# Patient Record
Sex: FEMALE | Race: WHITE | NOT HISPANIC OR LATINO | URBAN - NONMETROPOLITAN AREA
[De-identification: names, ages, dates, MRNs, and addresses within clinical notes are randomized per-mention and may not be internally consistent; named-entity substitution may affect disease eponyms.]

---

## 2018-03-29 ENCOUNTER — OFFICE VISIT CONVERTED (OUTPATIENT)
Dept: FAMILY MEDICINE CLINIC | Facility: CLINIC | Age: 63
End: 2018-03-29
Attending: NURSE PRACTITIONER

## 2018-03-29 ENCOUNTER — CONVERSION ENCOUNTER (OUTPATIENT)
Dept: FAMILY MEDICINE CLINIC | Facility: CLINIC | Age: 63
End: 2018-03-29

## 2018-06-13 ENCOUNTER — OFFICE VISIT CONVERTED (OUTPATIENT)
Dept: FAMILY MEDICINE CLINIC | Facility: CLINIC | Age: 63
End: 2018-06-13
Attending: NURSE PRACTITIONER

## 2018-08-21 ENCOUNTER — CONVERSION ENCOUNTER (OUTPATIENT)
Dept: FAMILY MEDICINE CLINIC | Facility: CLINIC | Age: 63
End: 2018-08-21
Attending: NURSE PRACTITIONER

## 2018-09-11 ENCOUNTER — OFFICE VISIT CONVERTED (OUTPATIENT)
Dept: FAMILY MEDICINE CLINIC | Facility: CLINIC | Age: 63
End: 2018-09-11
Attending: NURSE PRACTITIONER

## 2018-09-27 ENCOUNTER — OFFICE VISIT CONVERTED (OUTPATIENT)
Dept: FAMILY MEDICINE CLINIC | Facility: CLINIC | Age: 63
End: 2018-09-27
Attending: NURSE PRACTITIONER

## 2018-11-27 ENCOUNTER — CONVERSION ENCOUNTER (OUTPATIENT)
Dept: FAMILY MEDICINE CLINIC | Facility: CLINIC | Age: 63
End: 2018-11-27

## 2018-11-27 ENCOUNTER — OFFICE VISIT CONVERTED (OUTPATIENT)
Dept: FAMILY MEDICINE CLINIC | Facility: CLINIC | Age: 63
End: 2018-11-27
Attending: NURSE PRACTITIONER

## 2018-12-18 ENCOUNTER — CONVERSION ENCOUNTER (OUTPATIENT)
Dept: FAMILY MEDICINE CLINIC | Facility: CLINIC | Age: 63
End: 2018-12-18

## 2019-01-03 ENCOUNTER — OFFICE VISIT CONVERTED (OUTPATIENT)
Dept: FAMILY MEDICINE CLINIC | Facility: CLINIC | Age: 64
End: 2019-01-03
Attending: NURSE PRACTITIONER

## 2019-04-29 ENCOUNTER — OFFICE VISIT CONVERTED (OUTPATIENT)
Dept: FAMILY MEDICINE CLINIC | Facility: CLINIC | Age: 64
End: 2019-04-29
Attending: NURSE PRACTITIONER

## 2019-08-20 ENCOUNTER — OFFICE VISIT CONVERTED (OUTPATIENT)
Dept: FAMILY MEDICINE CLINIC | Facility: CLINIC | Age: 64
End: 2019-08-20
Attending: NURSE PRACTITIONER

## 2019-09-24 ENCOUNTER — OFFICE VISIT CONVERTED (OUTPATIENT)
Dept: FAMILY MEDICINE CLINIC | Facility: CLINIC | Age: 64
End: 2019-09-24
Attending: NURSE PRACTITIONER

## 2019-09-24 ENCOUNTER — CONVERSION ENCOUNTER (OUTPATIENT)
Dept: FAMILY MEDICINE CLINIC | Facility: CLINIC | Age: 64
End: 2019-09-24

## 2020-02-26 ENCOUNTER — HOSPITAL ENCOUNTER (OUTPATIENT)
Dept: GENERAL RADIOLOGY | Facility: HOSPITAL | Age: 65
Discharge: HOME | End: 2020-02-26
Attending: NURSE PRACTITIONER

## 2020-02-26 ENCOUNTER — OFFICE VISIT CONVERTED (OUTPATIENT)
Dept: FAMILY MEDICINE CLINIC | Facility: CLINIC | Age: 65
End: 2020-02-26
Attending: NURSE PRACTITIONER

## 2020-02-26 LAB
25(OH)D3 SERPL-MCNC: 22.4 NG/ML (ref 30–100)
ALBUMIN SERPL-MCNC: 4.4 G/DL (ref 3.5–5)
ALBUMIN/GLOB SERPL: 1.6 {RATIO} (ref 1.4–2.6)
ALP SERPL-CCNC: 94 U/L (ref 43–160)
ALT SERPL-CCNC: 18 U/L (ref 10–40)
ANION GAP SERPL CALC-SCNC: 17 MMOL/L (ref 8–19)
AST SERPL-CCNC: 19 U/L (ref 15–50)
BASOPHILS # BLD AUTO: 0.06 10*3/UL (ref 0–0.2)
BASOPHILS NFR BLD AUTO: 0.9 % (ref 0–3)
BILIRUB SERPL-MCNC: 0.38 MG/DL (ref 0.2–1.3)
BUN SERPL-MCNC: 16 MG/DL (ref 5–25)
BUN/CREAT SERPL: 23 {RATIO} (ref 6–20)
CALCIUM SERPL-MCNC: 10.2 MG/DL (ref 8.7–10.4)
CHLORIDE SERPL-SCNC: 98 MMOL/L (ref 99–111)
CHOLEST SERPL-MCNC: 167 MG/DL (ref 107–200)
CHOLEST/HDLC SERPL: 3.4 {RATIO} (ref 3–6)
CONV ABS IMM GRAN: 0.02 10*3/UL (ref 0–0.2)
CONV CO2: 28 MMOL/L (ref 22–32)
CONV IMMATURE GRAN: 0.3 % (ref 0–1.8)
CONV TOTAL PROTEIN: 7.1 G/DL (ref 6.3–8.2)
CREAT UR-MCNC: 0.7 MG/DL (ref 0.5–0.9)
DEPRECATED RDW RBC AUTO: 45.5 FL (ref 36.4–46.3)
EOSINOPHIL # BLD AUTO: 0.18 10*3/UL (ref 0–0.7)
EOSINOPHIL # BLD AUTO: 2.6 % (ref 0–7)
ERYTHROCYTE [DISTWIDTH] IN BLOOD BY AUTOMATED COUNT: 12.8 % (ref 11.7–14.4)
EST. AVERAGE GLUCOSE BLD GHB EST-MCNC: 120 MG/DL
GFR SERPLBLD BASED ON 1.73 SQ M-ARVRAT: >60 ML/MIN/{1.73_M2}
GLOBULIN UR ELPH-MCNC: 2.7 G/DL (ref 2–3.5)
GLUCOSE SERPL-MCNC: 92 MG/DL (ref 65–99)
HBA1C MFR BLD: 5.8 % (ref 3.5–5.7)
HCT VFR BLD AUTO: 48.9 % (ref 37–47)
HDLC SERPL-MCNC: 49 MG/DL (ref 40–60)
HGB BLD-MCNC: 15.9 G/DL (ref 12–16)
IRON SATN MFR SERPL: 26 % (ref 20–55)
IRON SERPL-MCNC: 112 UG/DL (ref 60–170)
LDLC SERPL CALC-MCNC: 83 MG/DL (ref 70–100)
LYMPHOCYTES # BLD AUTO: 1.75 10*3/UL (ref 1–5)
LYMPHOCYTES NFR BLD AUTO: 24.9 % (ref 20–45)
MCH RBC QN AUTO: 31.2 PG (ref 27–31)
MCHC RBC AUTO-ENTMCNC: 32.5 G/DL (ref 33–37)
MCV RBC AUTO: 95.9 FL (ref 81–99)
MONOCYTES # BLD AUTO: 0.59 10*3/UL (ref 0.2–1.2)
MONOCYTES NFR BLD AUTO: 8.4 % (ref 3–10)
NEUTROPHILS # BLD AUTO: 4.44 10*3/UL (ref 2–8)
NEUTROPHILS NFR BLD AUTO: 62.9 % (ref 30–85)
NRBC CBCN: 0 % (ref 0–0.7)
OSMOLALITY SERPL CALC.SUM OF ELEC: 287 MOSM/KG (ref 273–304)
PLATELET # BLD AUTO: 199 10*3/UL (ref 130–400)
PMV BLD AUTO: 11.8 FL (ref 9.4–12.3)
POTASSIUM SERPL-SCNC: 4.7 MMOL/L (ref 3.5–5.3)
RBC # BLD AUTO: 5.1 10*6/UL (ref 4.2–5.4)
SODIUM SERPL-SCNC: 138 MMOL/L (ref 135–147)
TIBC SERPL-MCNC: 426 UG/DL (ref 245–450)
TRANSFERRIN SERPL-MCNC: 298 MG/DL (ref 250–380)
TRIGL SERPL-MCNC: 175 MG/DL (ref 40–150)
TSH SERPL-ACNC: 1.6 M[IU]/L (ref 0.27–4.2)
VIT B12 SERPL-MCNC: 389 PG/ML (ref 211–911)
VLDLC SERPL-MCNC: 35 MG/DL (ref 5–37)
WBC # BLD AUTO: 7.04 10*3/UL (ref 4.8–10.8)

## 2020-03-10 ENCOUNTER — OFFICE VISIT CONVERTED (OUTPATIENT)
Dept: FAMILY MEDICINE CLINIC | Facility: CLINIC | Age: 65
End: 2020-03-10
Attending: NURSE PRACTITIONER

## 2020-08-18 ENCOUNTER — TELEPHONE CONVERTED (OUTPATIENT)
Dept: FAMILY MEDICINE CLINIC | Facility: CLINIC | Age: 65
End: 2020-08-18
Attending: NURSE PRACTITIONER

## 2020-10-08 ENCOUNTER — TELEPHONE CONVERTED (OUTPATIENT)
Dept: FAMILY MEDICINE CLINIC | Facility: CLINIC | Age: 65
End: 2020-10-08
Attending: FAMILY MEDICINE

## 2021-01-19 ENCOUNTER — TELEPHONE CONVERTED (OUTPATIENT)
Dept: FAMILY MEDICINE CLINIC | Facility: CLINIC | Age: 66
End: 2021-01-19
Attending: NURSE PRACTITIONER

## 2021-05-13 NOTE — PROGRESS NOTES
Progress Note      Patient Name: Carrie Haque   Patient ID: [id number]   Sex: Female   YOB: 1955    Primary Care Provider: Dashawn Louie DO   Referring Provider: Dashawn Louie DO    Visit Date: August 18, 2020    Provider: KATERINA Robledo   Location: [facility]   Location Address: [address] [place] [address]  [address]  [place], [place]  [id number]   Location Phone: [phone]          Chief Complaint  · sinus pain/nasal congestion      History Of Present Illness  TELEHEALTH TELEPHONE VISIT  Carrie Haque is a 65 year old /White female who is presenting for evaluation via telehealth telephone visit. Verbal consent obtained before beginning visit.   Provider spent 12 minutes with patient during telehealth visit.   The following staff were present during this visit: KATERINA Robledo   Past Medical History/Overview of Patient Symptoms  Carrie Haque is a 65 year old /White female who presents for evaluation and treatment of:      Pt c/o sinus pain/pressure around eyes, HA, ear fullness, low-grade fever, cough x 1 week. Denies SOB, muscle pain, sore throat, or sudden loss of taste or smell. Has been taking Tylenol and saline nasal spray to treat. No recent travel or sick contacts. Pt states S/S began after working outside in the garden last week.       Past Medical History  Disease Name Date Onset Notes   Allergic rhinitis --  --    Anxiety 11/24/2014 --    Arthritis --  --    Chronic Obstructive Pulmonary Disease --  --    COPD (chronic obstructive pulmonary disease) with chronic bronchitis 08/05/2016 --    Diverticulitis --  --    Essential hypertension 08/05/2016 --    Hypertension --  --    Hypertension, Benign Essential 10/30/2015 --    IBS (irritable colon syndrome) 10/30/2015 --    Kidney And Ureter Disorder --  --    Myofacial muscle pain --  --    Reflux Disease --  --    Sinus Trouble; unspecified --  --          Past Surgical History  Procedure Name  Date Notes   Cholecystectomy --  --    Hysterectomy --  --    Knee repair --  --          Medication List  Name Date Started Instructions   Augmentin 875-125 mg oral tablet 02/28/2020 take 1 tablet by oral route every 12 hours for 7 days   Bevespi Aerosphere 9-4.8 mcg inhalation HFA aerosol inhaler 11/13/2018 inhale 2 puffs by mouth twice a day IN THE MORNING AND EVENING   dicyclomine 20 mg oral tablet 09/24/2019 take 1 tablet (20 mg) by oral route 4 times per day for 30 days   duloxetine 30 mg oral capsule,delayed release(DR/EC) 05/11/2020 TAKE ONE CAPSULE BY MOUTH DAILY for 90 days   promethazine 25 mg oral tablet 09/24/2019 take 1 tablet (25 mg) by oral route every 6 hours as needed   Toprol XL 25 mg oral tablet extended release 24 hr 07/14/2020 take 1 tablet (25 mg) by oral route once daily for 30 days   triamterene-hydrochlorothiazid 37.5-25 mg oral tablet 05/11/2020 TAKE 1 TABLET BY MOUTH DAILY for 90 days   Ventolin HFA 90 mcg/actuation inhalation HFA aerosol inhaler  inhale 1 puff (90 mcg) by inhalation route every 6 hours as needed for 30 days   Vitamin D2 50,000 unit oral capsule 02/28/2020 take 1 capsule (50,000 unit) by oral route once weekly         Allergy List  Allergen Name Date Reaction Notes   azithromycin --  --  --    hydrocodone bitartrate --  --  --    hydrocodone-acetaminophen --  --  --    SULFA (SULFONAMIDES) --  --  --          Family Medical History  Disease Name Relative/Age Notes   *Unremarkable  --          Social History  Finding Status Start/Stop Quantity Notes   Alcohol Never --/-- --  --    Denies illicit substance abuse --  --/-- --  --    Denies substance abuse --  --/-- --  --    Moderate Amount of Exercise (1-3 times weekly) --  --/-- --  treadmill 4 X's a week for 15 minutes each session.   Tobacco Current every day 18 yrs. old/-- 1/2 ppd states trying to quit         Review of Systems  · Constitutional  o Admits  o : fever  o Denies  o : fatigue, weight loss, weight  gain  · HENT  o Admits  o : headaches, sinus pain, nasal congestion, nasal discharge, ear fullness, hoarseness  o Denies  o : sore throat, ear pain  · Cardiovascular  o Denies  o : lower extremity edema, claudication, chest pressure, palpitations  · Respiratory  o Admits  o : cough  o Denies  o : shortness of breath, wheezing, hemoptysis, dyspnea on exertion  · Gastrointestinal  o Denies  o : nausea, vomiting, diarrhea, constipation, abdominal pain  · Musculoskeletal  o Denies  o : muscle pain  · Psychiatric  o Denies  o : anxiety, depression, suicidal ideation, homicidal ideation  · Allergic-Immunologic  o Admits  o : sinus allergy symptoms  o Denies  o : frequent illnesses          Assessment  · Allergic rhinitis due to allergen     477.9/J30.9  · COPD (chronic obstructive pulmonary disease)     496/J44.9  · Essential hypertension     401.9/I10  · Sinusitis, acute     461.9/J01.90       Sinusitis:  Augmentin 875mg PO bid x 7 days  Prednisone 40mg PO qd x 7 days  Increase rest  Increase clear fluids  Continue Tyl q4-6hrs UAD PRN pain/fever  Avoid allergy/sinus triggers           Plan  · Orders  o ACO-39: Current medications updated and reviewed () - - 08/18/2020  o Physician Telephone Evaluation, 11-20 minutes (89153) - - 08/18/2020  · Medications  o prednisone 20 mg oral tablet   SIG: take 2 tablets (40 mg) by oral route once daily for 7 days   DISP: (14) tablets with 0 refills  Prescribed on 08/18/2020     o Augmentin 875-125 mg oral tablet   SIG: take 1 tablet by oral route every 12 hours for 7 days   DISP: (14) tablets with 0 refills  Refilled on 08/18/2020     o triamterene-hydrochlorothiazid 37.5-25 mg oral tablet   SIG: TAKE 1 TABLET BY MOUTH DAILY for 90 days   DISP: (90) Tablet with 1 refills  Refilled on 08/18/2020     · Instructions  o Goals of Care include: reduce long-term decline in lung function, prevent and treat exacerbations, reduce hospitalizations and mortality, relieve disabling dyspnea,  and improve exercise tolerance and health related quality of life.  o Patient advised to monitor blood pressure (B/P) at home and journal readings. Patient informed that a B/P reading at home of more than 130/80 is considered hypertension. For readings greater vcuo203/90 or higher patient is advised to follow up in the office with readings for management. Patient advised to limit sodium intake.  o Plan Of Care: sinusitis  o Chronic conditions reviewed and taken into consideration for today's treatment plan.  o Patient instructed to seek medical attention urgently for new or worsening symptoms.  o Patient was educated/instructed on their diagnosis, treatment and medications prior to discharge from the clinic today.  o Take all medications as prescribed/directed.  o Rest. Increase Fluids.  o Call the office with any concerns or questions.  o Risks, benefits, and alternatives were discussed with the patient. The patient is aware of risks associated with: all medications and conditions.   o Discussed Covid-19 precautions including, but not limited to, social distancing, avoid touching your face, and hand washing.   o Bring all medicines with their bottles to each office visit.  · Disposition  o Call or Return if symptoms worsen or persist.  o Follow Up PRN.  o Proceed to ED for all medical emergencies.            Electronically Signed by: KATERINA Robledo -Author on August 18, 2020 10:54:18 AM

## 2021-05-13 NOTE — PROGRESS NOTES
Progress Note      Patient Name: Carrie Haque   Patient ID: [id number]   Sex: Female   YOB: 1955    Primary Care Provider: Dashawn Louie DO   Referring Provider: Dashawn Louie DO    Visit Date: October 8, 2020    Provider: Kassandra Luz DO   Location: [facility]   Location Address: [address] [place] [address]  [id number]   Location Phone: [phone]          Chief Complaint  · Anxiety       History Of Present Illness  TELEHEALTH TELEPHONE VISIT  Carrie Haque is a 65 year old /White female who is presenting for evaluation via telehealth telephone visit. Verbal consent obtained before beginning visit. Patient's  is present for vist.   Provider spent 12 minutes with patient during telehealth visit.   The following staff were present during this visit: Yang LIN, Dr.Kerry Natty CHOW.   Past Medical History/Overview of Patient Symptoms  Carrie Haque is a 65 year old /White female who presents for evaluation and treatment of:      She is being managed via telephone. She recently was diagnosed with possible liver CA. She has a history of lung CA. She is having worsening anxiety at todays visit. She is having episodes of panic. She has never had anxiety this severe in the past.       Past Medical History  Disease Name Date Onset Notes   Allergic rhinitis --  --    Anxiety 11/24/2014 --    Arthritis --  --    Chronic Obstructive Pulmonary Disease --  --    COPD (chronic obstructive pulmonary disease) with chronic bronchitis 08/05/2016 --    Diverticulitis --  --    Essential hypertension 08/05/2016 --    Hypertension --  --    Hypertension, Benign Essential 10/30/2015 --    IBS (irritable colon syndrome) 10/30/2015 --    Kidney And Ureter Disorder --  --    Myofacial muscle pain --  --    Reflux Disease --  --    Sinus Trouble; unspecified --  --          Past Surgical History  Procedure Name Date Notes   Cholecystectomy --  --     Hysterectomy --  --    Knee repair --  --          Medication List  Name Date Started Instructions   Bevespi Aerosphere 9-4.8 mcg inhalation HFA aerosol inhaler 11/13/2018 inhale 2 puffs by mouth twice a day IN THE MORNING AND EVENING   dicyclomine 20 mg oral tablet 10/08/2020 take 1 tablet (20 mg) by oral route 4 times per day for 30 days   promethazine 25 mg oral tablet 10/08/2020 take 1 tablet (25 mg) by oral route every 6 hours as needed for 30 days   Toprol XL 25 mg oral tablet extended release 24 hr 07/14/2020 take 1 tablet (25 mg) by oral route once daily for 30 days   triamterene-hydrochlorothiazid 37.5-25 mg oral tablet 08/18/2020 TAKE 1 TABLET BY MOUTH DAILY for 90 days   Ventolin HFA 90 mcg/actuation inhalation HFA aerosol inhaler 10/08/2020 inhale 1 puff (90 mcg) by inhalation route every 6 hours as needed for 30 days   Vitamin D2 50,000 unit oral capsule 02/28/2020 take 1 capsule (50,000 unit) by oral route once weekly         Allergy List  Allergen Name Date Reaction Notes   azithromycin --  --  --    hydrocodone bitartrate --  --  --    hydrocodone-acetaminophen --  --  --    SULFA (SULFONAMIDES) --  --  --          Family Medical History  Disease Name Relative/Age Notes   *Unremarkable  --          Social History  Finding Status Start/Stop Quantity Notes   Alcohol Never --/-- --  --    Denies illicit substance abuse --  --/-- --  --    Denies substance abuse --  --/-- --  --    Moderate Amount of Exercise (1-3 times weekly) --  --/-- --  treadmill 4 X's a week for 15 minutes each session.   Tobacco Current every day 18 yrs. old/-- 1ppd states trying to quit                 Assessment  · Anxiety disorder     300.00/F41.9  She will be given a short course of Ativan with close follow-up.       Plan  · Orders  o ACO-39: Current medications updated and reviewed (, 1159F) - - 10/08/2020  o ACO-15: Pneumococcal Vaccine Administered or Previously Received [facility] (02422) - - 10/08/2020  o ACO-14:  Influenza immunization administered or previously received [facility] ([id number]) - - 10/08/2020  o ACO-13: Fall Risk Screening with no falls in past year or only one fall without injury in the past year (1101F) - - 10/08/2020  · Medications  o Medications have been Reconciled  o Transition of Care or Provider Policy  · Instructions  o Plan Of Care:   · Disposition  o Return Visit Request in/on 3 weeks +/- 2 days (90394).            Electronically Signed by: Kassandra Luz DO - on October 17, 2020 09:12:39 PM

## 2021-05-14 NOTE — PROGRESS NOTES
Progress Note      Patient Name: Carrie Haque   Patient ID: [id number]   Sex: Female   YOB: 1955    Primary Care Provider: Dashawn Louie DO   Referring Provider: Dashawn Louie DO    Visit Date: January 19, 2021    Provider: KATERINA Robledo   Location: [facility]   Location Address: [address] [place] [address]  [id number]   Location Phone: [phone]          Chief Complaint  · Medication refills.      History Of Present Illness  TELEHEALTH TELEPHONE VISIT  Carrie Haque is a 65 year old /White female who is presenting for evaluation via telehealth telephone visit. Verbal consent obtained before beginning visit.   Provider spent 13 minutes with patient during telehealth visit.   The following staff were present during this visit: KATERINA Robledo   Past Medical History/Overview of Patient Symptoms  Carrie Haque is a 65 year old /White female who presents for evaluation and treatment of:      Pt presents for medication refills, sinus congestion/cough, nausea d/t chemotherapy.            Past Medical History  Disease Name Date Onset Notes   Allergic rhinitis --  --    Anxiety 11/24/2014 --    Arthritis --  --    Chronic Obstructive Pulmonary Disease --  --    COPD (chronic obstructive pulmonary disease) with chronic bronchitis 08/05/2016 --    Diverticulitis --  --    Essential hypertension 08/05/2016 --    Hypertension --  --    Hypertension, Benign Essential 10/30/2015 --    IBS (irritable colon syndrome) 10/30/2015 --    Kidney And Ureter Disorder --  --    Myofacial muscle pain --  --    Reflux Disease --  --    Sinus Trouble; unspecified --  --          Past Surgical History  Procedure Name Date Notes   Cholecystectomy --  --    Hysterectomy --  --    Knee repair --  --          Medication List  Name Date Started Instructions   Bevespi Aerosphere 9-4.8 mcg inhalation HFA aerosol inhaler 11/13/2018 inhale 2 puffs by mouth twice a day  IN THE MORNING AND EVENING   dicyclomine 20 mg oral tablet 10/08/2020 take 1 tablet (20 mg) by oral route 4 times per day for 30 days   promethazine 25 mg oral tablet 01/19/2021 take 1 tablet (25 mg) by oral route every 6 hours as needed for 30 days   Toprol XL 25 mg oral tablet extended release 24 hr 01/19/2021 take 1 tablet (25 mg) by oral route once daily for 90 days   triamterene-hydrochlorothiazid 37.5-25 mg oral tablet 01/19/2021 TAKE 1 TABLET BY MOUTH DAILY for 90 days   Ventolin HFA 90 mcg/actuation inhalation HFA aerosol inhaler 10/08/2020 inhale 1 puff (90 mcg) by inhalation route every 6 hours as needed for 30 days   Vitamin D2 50,000 unit oral capsule 02/28/2020 take 1 capsule (50,000 unit) by oral route once weekly         Allergy List  Allergen Name Date Reaction Notes   azithromycin --  --  --    hydrocodone bitartrate --  --  --    hydrocodone-acetaminophen --  --  --    SULFA (SULFONAMIDES) --  --  --          Family Medical History  Disease Name Relative/Age Notes   *Unremarkable  --          Social History  Finding Status Start/Stop Quantity Notes   Alcohol Never --/-- --  --    Denies illicit substance abuse --  --/-- --  --    Denies substance abuse --  --/-- --  --    Moderate Amount of Exercise (1-3 times weekly) --  --/-- --  treadmill 4 X's a week for 15 minutes each session.   Tobacco Current every day 18 yrs. old/-- 1ppd states trying to quit         Review of Systems  · Constitutional  o Denies  o : fever, fatigue, weight loss, weight gain  · HENT  o Admits  o : nasal congestion  · Cardiovascular  o Denies  o : lower extremity edema, claudication, chest pressure, palpitations  · Respiratory  o Admits  o : cough  o Denies  o : shortness of breath, wheezing, hemoptysis, dyspnea on exertion  · Gastrointestinal  o Admits  o : nausea  o Denies  o : vomiting, diarrhea, constipation, abdominal pain  · Musculoskeletal  o Denies  o : muscle pain  · Psychiatric  o Denies  o : anxiety, depression,  suicidal ideation, homicidal ideation              Assessment  · Cough     786.2/R05  · Essential hypertension     401.9/I10  · Sinusitis, acute     461.9/J01.90  · Nasal congestion     478.19/R09.81  · Nausea     787.02/R11.0       Med refills:    Essential Htn:  Refilled Toprol 25mg PO qd  Refilled triamterene/hztz 37.5/25mg PO qd  Low NA diet  Take BP reg, keep log, bring to next appt  Labs at next appt    Sinusitis/cough:  prednisone 40mg PO qd  Increase rest  Increase clear fluids    Nausea:  Refilled prednisone 25mg PRN  BRAT diet       Plan  · Orders  o Physician Telephone Evaluation, 11-20 minutes (72638) - - 01/19/2021  o ACO-39: Current medications updated and reviewed (, 1159F) - - 01/19/2021  · Medications  o prednisone 20 mg oral tablet   SIG: take 2 tablets (40 mg) by oral route once daily for 7 days   DISP: (14) Tablet with 0 refills  Prescribed on 01/19/2021     o promethazine 25 mg oral tablet   SIG: take 1 tablet (25 mg) by oral route every 6 hours as needed for 30 days   DISP: (60) Tablet with 0 refills  Refilled on 01/19/2021     o Toprol XL 25 mg oral tablet extended release 24 hr   SIG: take 1 tablet (25 mg) by oral route once daily for 90 days   DISP: (90) Tablet with 1 refills  Refilled on 01/19/2021     o triamterene-hydrochlorothiazid 37.5-25 mg oral tablet   SIG: TAKE 1 TABLET BY MOUTH DAILY for 90 days   DISP: (90) Tablet with 1 refills  Refilled on 01/19/2021     o Medications have been Reconciled  o Transition of Care or Provider Policy  · Instructions  o Patient advised to monitor blood pressure (B/P) at home and journal readings. Patient informed that a B/P reading at home of more than 130/80 is considered hypertension. For readings greater ualb830/90 or higher patient is advised to follow up in the office with readings for management. Patient advised to limit sodium intake.  o Plan Of Care: hypertension  o Chronic conditions reviewed and taken into consideration for today's  treatment plan.  o Patient instructed to seek medical attention urgently for new or worsening symptoms.  o Patient was educated/instructed on their diagnosis, treatment and medications prior to discharge from the clinic today.  o Take all medications as prescribed/directed.  o Call the office with any concerns or questions.  o Risks, benefits, and alternatives were discussed with the patient. The patient is aware of risks associated with: all meds and conditions.   o Discussed Covid-19 precautions including, but not limited to, social distancing, avoid touching your face, and hand washing.   o Bring all medicines with their bottles to each office visit.  · Disposition  o Call or Return if symptoms worsen or persist.  o Follow Up PRN.  o Follow Up in 6 months.  o Proceed to ED for all medical emergencies.            Electronically Signed by: KATERINA Robledo -Author on January 19, 2021 03:43:45 PM

## 2021-05-15 VITALS
SYSTOLIC BLOOD PRESSURE: 138 MMHG | HEIGHT: 61 IN | RESPIRATION RATE: 20 BRPM | BODY MASS INDEX: 29.66 KG/M2 | OXYGEN SATURATION: 97 % | DIASTOLIC BLOOD PRESSURE: 64 MMHG | WEIGHT: 157.12 LBS | HEART RATE: 80 BPM | TEMPERATURE: 97.3 F

## 2021-05-15 VITALS
HEART RATE: 78 BPM | DIASTOLIC BLOOD PRESSURE: 76 MMHG | BODY MASS INDEX: 30.4 KG/M2 | WEIGHT: 161 LBS | HEIGHT: 61 IN | RESPIRATION RATE: 20 BRPM | OXYGEN SATURATION: 97 % | TEMPERATURE: 98.7 F | SYSTOLIC BLOOD PRESSURE: 146 MMHG

## 2021-05-15 VITALS
HEART RATE: 122 BPM | HEIGHT: 61 IN | OXYGEN SATURATION: 97 % | TEMPERATURE: 98.8 F | WEIGHT: 157.12 LBS | BODY MASS INDEX: 29.66 KG/M2 | DIASTOLIC BLOOD PRESSURE: 63 MMHG | RESPIRATION RATE: 20 BRPM | SYSTOLIC BLOOD PRESSURE: 143 MMHG

## 2021-05-15 VITALS
WEIGHT: 158.37 LBS | HEART RATE: 100 BPM | DIASTOLIC BLOOD PRESSURE: 79 MMHG | TEMPERATURE: 98 F | OXYGEN SATURATION: 98 % | BODY MASS INDEX: 29.9 KG/M2 | SYSTOLIC BLOOD PRESSURE: 138 MMHG | HEIGHT: 61 IN | RESPIRATION RATE: 20 BRPM

## 2021-05-15 VITALS
HEART RATE: 115 BPM | TEMPERATURE: 98 F | DIASTOLIC BLOOD PRESSURE: 55 MMHG | OXYGEN SATURATION: 98 % | HEIGHT: 61 IN | SYSTOLIC BLOOD PRESSURE: 128 MMHG | RESPIRATION RATE: 20 BRPM | WEIGHT: 157.37 LBS | BODY MASS INDEX: 29.71 KG/M2

## 2021-05-16 VITALS
HEART RATE: 94 BPM | BODY MASS INDEX: 28.58 KG/M2 | TEMPERATURE: 98.2 F | OXYGEN SATURATION: 98 % | WEIGHT: 151.37 LBS | DIASTOLIC BLOOD PRESSURE: 76 MMHG | HEIGHT: 61 IN | RESPIRATION RATE: 20 BRPM | SYSTOLIC BLOOD PRESSURE: 148 MMHG

## 2021-05-16 VITALS
OXYGEN SATURATION: 98 % | BODY MASS INDEX: 26.91 KG/M2 | RESPIRATION RATE: 20 BRPM | DIASTOLIC BLOOD PRESSURE: 84 MMHG | HEART RATE: 86 BPM | SYSTOLIC BLOOD PRESSURE: 134 MMHG | HEIGHT: 61 IN | WEIGHT: 142.5 LBS | TEMPERATURE: 98.5 F

## 2021-05-16 VITALS
HEART RATE: 103 BPM | OXYGEN SATURATION: 96 % | HEIGHT: 61 IN | TEMPERATURE: 98 F | DIASTOLIC BLOOD PRESSURE: 90 MMHG | WEIGHT: 147.56 LBS | RESPIRATION RATE: 20 BRPM | SYSTOLIC BLOOD PRESSURE: 134 MMHG | BODY MASS INDEX: 27.86 KG/M2

## 2021-05-16 VITALS
WEIGHT: 140.5 LBS | TEMPERATURE: 98.9 F | OXYGEN SATURATION: 96 % | SYSTOLIC BLOOD PRESSURE: 155 MMHG | HEART RATE: 96 BPM | DIASTOLIC BLOOD PRESSURE: 64 MMHG | RESPIRATION RATE: 18 BRPM

## 2021-05-16 VITALS — SYSTOLIC BLOOD PRESSURE: 151 MMHG | DIASTOLIC BLOOD PRESSURE: 72 MMHG

## 2021-05-16 VITALS
WEIGHT: 148 LBS | RESPIRATION RATE: 20 BRPM | HEIGHT: 61 IN | OXYGEN SATURATION: 95 % | SYSTOLIC BLOOD PRESSURE: 146 MMHG | BODY MASS INDEX: 27.94 KG/M2 | HEART RATE: 93 BPM | TEMPERATURE: 98.1 F | DIASTOLIC BLOOD PRESSURE: 53 MMHG

## 2021-05-16 VITALS
RESPIRATION RATE: 24 BRPM | OXYGEN SATURATION: 98 % | SYSTOLIC BLOOD PRESSURE: 143 MMHG | BODY MASS INDEX: 27.39 KG/M2 | TEMPERATURE: 98.1 F | DIASTOLIC BLOOD PRESSURE: 63 MMHG | HEIGHT: 61 IN | WEIGHT: 145.06 LBS | HEART RATE: 102 BPM

## 2021-05-16 VITALS
TEMPERATURE: 98.5 F | HEART RATE: 102 BPM | OXYGEN SATURATION: 95 % | HEIGHT: 61 IN | WEIGHT: 145.31 LBS | DIASTOLIC BLOOD PRESSURE: 69 MMHG | BODY MASS INDEX: 27.43 KG/M2 | RESPIRATION RATE: 20 BRPM | SYSTOLIC BLOOD PRESSURE: 136 MMHG

## 2021-05-16 VITALS — SYSTOLIC BLOOD PRESSURE: 152 MMHG | DIASTOLIC BLOOD PRESSURE: 74 MMHG
